# Patient Record
Sex: FEMALE | Race: WHITE | NOT HISPANIC OR LATINO | URBAN - METROPOLITAN AREA
[De-identification: names, ages, dates, MRNs, and addresses within clinical notes are randomized per-mention and may not be internally consistent; named-entity substitution may affect disease eponyms.]

---

## 2017-02-07 ENCOUNTER — HISTORICAL (OUTPATIENT)
Dept: ADMINISTRATIVE | Facility: HOSPITAL | Age: 52
End: 2017-02-07

## 2018-02-19 ENCOUNTER — HISTORICAL (OUTPATIENT)
Dept: ADMINISTRATIVE | Facility: HOSPITAL | Age: 53
End: 2018-02-19

## 2018-10-03 ENCOUNTER — HISTORICAL (OUTPATIENT)
Dept: ADMINISTRATIVE | Facility: HOSPITAL | Age: 53
End: 2018-10-03

## 2018-10-17 ENCOUNTER — HISTORICAL (OUTPATIENT)
Dept: CARDIOLOGY | Facility: HOSPITAL | Age: 53
End: 2018-10-17

## 2018-10-17 LAB
APTT PPP: 31.6 SECOND(S) (ref 24.8–36.9)
INR PPP: 0.93 (ref 0–1.27)
PLATELET # BLD AUTO: 303 X10(3)/MCL (ref 130–400)
PROTHROMBIN TIME: 12.7 SECOND(S) (ref 12.2–14.7)

## 2019-05-06 ENCOUNTER — HISTORICAL (OUTPATIENT)
Dept: ADMINISTRATIVE | Facility: HOSPITAL | Age: 54
End: 2019-05-06

## 2019-06-06 ENCOUNTER — HISTORICAL (OUTPATIENT)
Dept: ADMINISTRATIVE | Facility: HOSPITAL | Age: 54
End: 2019-06-06

## 2019-07-22 ENCOUNTER — HISTORICAL (OUTPATIENT)
Dept: ADMINISTRATIVE | Facility: HOSPITAL | Age: 54
End: 2019-07-22

## 2021-01-21 ENCOUNTER — HISTORICAL (OUTPATIENT)
Dept: ADMINISTRATIVE | Facility: HOSPITAL | Age: 56
End: 2021-01-21

## 2021-10-19 ENCOUNTER — HISTORICAL (OUTPATIENT)
Dept: ADMINISTRATIVE | Facility: HOSPITAL | Age: 56
End: 2021-10-19

## 2021-10-19 LAB
ABS NEUT (OLG): 7.94 X10(3)/MCL (ref 2.1–9.2)
ALBUMIN SERPL-MCNC: 3.7 GM/DL (ref 3.5–5)
ALBUMIN/GLOB SERPL: 0.8 RATIO (ref 1.1–2)
ALP SERPL-CCNC: 119 UNIT/L (ref 40–150)
ALT SERPL-CCNC: 14 UNIT/L (ref 0–55)
AST SERPL-CCNC: 17 UNIT/L (ref 5–34)
BASOPHILS # BLD AUTO: 0.07 X10(3)/MCL (ref 0–0.2)
BASOPHILS NFR BLD AUTO: 0.6 % (ref 0–1)
BILIRUB SERPL-MCNC: 0.3 MG/DL (ref 0.2–1.2)
BILIRUBIN DIRECT+TOT PNL SERPL-MCNC: 0.1 MG/DL (ref 0–0.5)
BILIRUBIN DIRECT+TOT PNL SERPL-MCNC: 0.2 MG/DL (ref 0–0.8)
BUN SERPL-MCNC: 15.7 MG/DL (ref 9.8–20.1)
CALCIUM SERPL-MCNC: 9.9 MG/DL (ref 8.4–10.2)
CHLORIDE SERPL-SCNC: 106 MMOL/L (ref 98–107)
CO2 SERPL-SCNC: 25 MMOL/L (ref 22–29)
CREAT SERPL-MCNC: 1.09 MG/DL (ref 0.57–1.11)
EOSINOPHIL # BLD AUTO: 0.23 X10(3)/MCL (ref 0–0.9)
EOSINOPHIL NFR BLD AUTO: 1.9 % (ref 0–6.4)
ERYTHROCYTE [DISTWIDTH] IN BLOOD BY AUTOMATED COUNT: 14.8 % (ref 11.5–17)
EST. AVERAGE GLUCOSE BLD GHB EST-MCNC: 125.5 MG/DL
GLOBULIN SER-MCNC: 4.4 GM/DL (ref 2.4–3.5)
GLUCOSE SERPL-MCNC: 115 MG/DL (ref 74–100)
HBA1C MFR BLD: 6 %
HCT VFR BLD AUTO: 43.6 % (ref 37–47)
HGB BLD-MCNC: 13.6 GM/DL (ref 12–16)
IMM GRANULOCYTES # BLD AUTO: 0.04 10*3/UL (ref 0–0.02)
IMM GRANULOCYTES NFR BLD AUTO: 0.3 % (ref 0–0.43)
LYMPHOCYTES # BLD AUTO: 2.82 X10(3)/MCL (ref 0.6–4.6)
LYMPHOCYTES NFR BLD AUTO: 23.5 % (ref 16–44)
MCH RBC QN AUTO: 27.3 PG (ref 27–31)
MCHC RBC AUTO-ENTMCNC: 31.2 GM/DL (ref 33–36)
MCV RBC AUTO: 87.4 FL (ref 80–94)
MONOCYTES # BLD AUTO: 0.89 X10(3)/MCL (ref 0.1–1.3)
MONOCYTES NFR BLD AUTO: 7.4 % (ref 4–12.1)
MRSA SCREEN BY PCR: NEGATIVE
NEUTROPHILS # BLD AUTO: 7.94 X10(3)/MCL (ref 2.1–9.2)
NEUTROPHILS NFR BLD AUTO: 66.3 % (ref 43–73)
NRBC BLD AUTO-RTO: 0 % (ref 0–0.2)
PLATELET # BLD AUTO: 367 X10(3)/MCL (ref 130–400)
PMV BLD AUTO: 9.5 FL (ref 7.4–10.4)
POTASSIUM SERPL-SCNC: 3.9 MMOL/L (ref 3.5–5.1)
PROT SERPL-MCNC: 8.1 GM/DL (ref 6.4–8.3)
RBC # BLD AUTO: 4.99 X10(6)/MCL (ref 4.2–5.4)
SODIUM SERPL-SCNC: 141 MMOL/L (ref 136–145)
WBC # SPEC AUTO: 12 X10(3)/MCL (ref 4.5–11.5)

## 2021-11-05 ENCOUNTER — HISTORICAL (OUTPATIENT)
Dept: LAB | Facility: HOSPITAL | Age: 56
End: 2021-11-05

## 2021-11-05 LAB — SARS-COV-2 AG RESP QL IA.RAPID: NEGATIVE

## 2021-11-23 ENCOUNTER — HISTORICAL (OUTPATIENT)
Dept: ADMINISTRATIVE | Facility: HOSPITAL | Age: 56
End: 2021-11-23

## 2021-12-21 ENCOUNTER — HISTORICAL (OUTPATIENT)
Dept: ADMINISTRATIVE | Facility: HOSPITAL | Age: 56
End: 2021-12-21

## 2022-04-10 ENCOUNTER — HISTORICAL (OUTPATIENT)
Dept: ADMINISTRATIVE | Facility: HOSPITAL | Age: 57
End: 2022-04-10
Payer: MEDICARE

## 2022-04-27 VITALS
SYSTOLIC BLOOD PRESSURE: 117 MMHG | HEIGHT: 64 IN | WEIGHT: 235.88 LBS | DIASTOLIC BLOOD PRESSURE: 68 MMHG | BODY MASS INDEX: 40.27 KG/M2

## 2022-05-03 NOTE — HISTORICAL [facility] CERNER
This is a historical note converted from Zhou. Formatting and pictures may have been removed.  Please reference Zhou for original formatting and attached multimedia. Chief Complaint  BILATERAL KNEE PAIN DOI 2 MONTHS NO INJURY.  History of Present Illness  She comes in complaining of bilateral knee pain. I had last seen her for her back and recommended lumbar epidural steroid injections. She was not called from the doctor and Gaby to have these performed.?Her back hurts?going down both legs. Both knees hurt over the inside portion of the knees. Of the right knee and goes all the way down the leg to the foot this is more consistent with her back. In the left knee it hurts all the way around to the back part of it. Walking makes it hurt and sometimes even?bending them her sleeping causes him to hurt.  ?  Her back continues to hurt all the time.  Review of Systems  Constitutional:?no fever, fatigue, weakness  Eye:?no vision loss, eye redness, drainage, or pain  ENMT:?no sore throat, ear pain, sinus pain/congestion, nasal congestion/drainage  Respiratory:?no cough, no wheezing, no shortness of breath  Cardiovascular:?no chest pain, no palpitations, no edema  Gastrointestinal:?no nausea, vomiting, or diarrhea. No abdominal pain  Genitourinary:?no dysuria, no urinary frequency or urgency, no hematuria  Hema/Lymph:?no abnormal bruising or bleeding  Endocrine:?no heat or cold intolerance, no excessive thirst or excessive urination  Musculoskeletal:?no muscle or joint pain, no joint swelling  Integumentary:?no skin rash or abnormal lesion  Neurologic: no headache, no dizziness, no weakness or numbness  ?  Physical Exam  Vitals & Measurements  BP:?128/82?  HT:?156?cm? WT:?114.75?kg? BMI:?47.15?  Examination of the?right knee reveals tenderness?around the medial aspect. She has crepitus throughout a range of motion. She flexes from 0-505?. She is stable. Neurovascularly she is intact.  ?  Examination of the left knee  reveals tenderness over the medial joint line and posteriorly.?Again she has crepitance primarily medially and under the patella joint. She is stable to varus and valgus.  ?  Examination of her lumbar spine is unchanged.  Assessment/Plan  1.?Lumbar spinal stenosis?M48.061  ? I explained to her that she has to lose weight her BMI is well over 45. She is diabetic and we discussed injecting both knees but she would rather not.?We will try her on Mobic 7.5.?I did caution her about being on her blood thinners.?The injection she would rather not have secondary to her diabetes. She did request for us to try to?set up her LESIs here in [place] and we will do that.?I will see her back on an as-needed basis.  Ordered:  meloxicam, 7.5 mg = 1 tab(s), Oral, BID, # 60 tab(s), 3 Refill(s), Pharmacy: [facility]  Office/Outpatient Visit Level 3 Established [id number] PC, Lumbar spinal stenosis  Lumbar radiculopathy  Lumbar degenerative disc disease  Bilateral primary osteoarthritis of knee  Knee pain, [facility], 05/06/19 10:28:00 CDT  ?  2.?Lumbar radiculopathy?M54.16  Ordered:  meloxicam, 7.5 mg = 1 tab(s), Oral, BID, # 60 tab(s), 3 Refill(s), Pharmacy: [facility]  Office/Outpatient Visit Level 3 Established [id number] PC, Lumbar spinal stenosis  Lumbar radiculopathy  Lumbar degenerative disc disease  Bilateral primary osteoarthritis of knee  Knee pain, [facility], 05/06/19 10:28:00 CDT  ?  3.?Lumbar degenerative disc disease?M51.36  Ordered:  meloxicam, 7.5 mg = 1 tab(s), Oral, BID, # 60 tab(s), 3 Refill(s), Pharmacy: [facility]  Office/Outpatient Visit Level 3 Established [id number] PC, Lumbar spinal stenosis  Lumbar radiculopathy  Lumbar degenerative disc disease  Bilateral primary osteoarthritis of knee  Knee pain, [facility], 05/06/19 10:28:00 CDT  ?  4.?Bilateral primary osteoarthritis of knee?M17.0  Ordered:  meloxicam, 7.5 mg = 1 tab(s), Oral, BID, # 60 tab(s), 3  Refill(s), Pharmacy: [id number]  Office/Outpatient Visit Level 3 Established [id number] PC, Lumbar spinal stenosis  Lumbar radiculopathy  Lumbar degenerative disc disease  Bilateral primary osteoarthritis of knee  Knee pain, [facility], 19 10:28:00 CDT  ?  Orders:  Clinic Follow-up PRN, 19 10:28:00 CDT, Future Order, [facility]  XR Knee Left 4 or More Views, Routine, 19 10:03:00 CDT, Pain, None, Patient Bed, Rad Type, Knee pain, Not Scheduled, 19 10:03:00 CDT  XR Knee Right 4 or More Views, Routine, 19 10:03:00 CDT, Pain, None, Patient Bed, Rad Type, Knee pain, Not Scheduled, 19 10:03:00 CDT  Referrals  Ambulatory Referral, Specialty: Physical Medicine & Rehab, Reason: LESI, Start: 19 10:28:00 CDT  Clinic Follow-up PRN, 19 10:28:00 CDT, Future Order, [facility]   Problem List/Past Medical History  Ongoing  Bladder  DVT (deep venous thrombosis)  Lumbar degenerative disc disease  Lumbar facet arthropathy  Lumbar radiculopathy  Lumbar spinal stenosis  Morbid obesity  O/E wound healing delayed  PE (pulmonary embolism)  Historical  Acute gingivitis  Anticoagulants causing adverse effect in therapeutic use  Arthropathy  Benign essential hypertension  Dental caries  Dry skin  Endometrial intraepithelial neoplasia (EIN)  Fatigue  GERD - Gastro-esophageal reflux disease  Hand joint pain  Hypertension  Menopausal flushing  Menopausal state  Mixed hyperlipidemia  Obesity  Osteoarthrosis  Pain in limb  Postmenopausal bleeding  Sleep disturbance  Suprapubic pain  Urinary tract infection  Visual impairment  Procedure/Surgical History  Fluoroscopy of Spinal Cord using Low Osmolar Contrast (10/17/2018)  Myelography via lumbar injection, including radiological supervision and interpretation; lumbosacral (10/17/2018)  removal of gallbladder (2000)  appendectomy ()  Abdominal hysterectomy   Delivery  dilation and curettage  Hernia  Revise  ovarian tube   Medications  atenolol 50 mg oral tablet, 50 mg= 1 tab(s), Oral, Daily  azithromycin 250 mg oral tablet, Oral, As Directed  cefuroxime 250 mg oral tablet, 250 mg= 1 tab(s), Oral, BID  cetirizine 10 mg oral tablet, 10 mg= 1 tab(s), Oral, Daily  citalopram 20 mg oral tablet, 20 mg= 1 tab(s), Oral, Daily  famotidine 20 mg oral tablet, 20 mg= 1 tab(s), Oral, BID  lisinopril 5 mg oral tablet, 5 mg= 1 tab(s), Oral, Daily  Mobic 7.5 mg oral tablet, 7.5 mg= 1 tab(s), Oral, BID, 3 refills  nitrofurantoin macrocrystals 100 mg oral capsule, 100 mg= 1 cap(s), Oral, BID  nitrofurantoin macrocrystals 50 mg oral capsule, 50 mg= 1 cap(s), Oral, qPM  nitrofurantoin macrocrystals-monohydrate 100 mg oral capsule, 100 mg= 1 cap(s), Oral, BID  Nystop 100,000 units/g topical powder, 1 bhavesh, TOP, TID  PRAVASTATIN SODIUM 20 MG TAB  sulfamethoxazole-trimethoprim 800 mg-160 mg oral tablet, 1 tab(s), Oral, BID  Toviaz 4 mg oral tablet, extended release, 4 mg= 1 tab(s), Oral, Daily  Xarelto 20mg Tablet, 20 mg, Oral, With Supper  Allergies  No Known Allergies  Social History  Alcohol - Denies Alcohol Use, 09/18/2015  Never, 09/23/2015  Employment/School  Unemployed, 09/18/2015  Home/Environment  Lives with Children., 09/23/2015  Substance Abuse  Never, 09/23/2015  Tobacco - Denies Tobacco Use, 09/18/2015  Never smoker, 09/23/2015  Family History  Chronic obstructive lung disease: Mother.  Heart disease: Father.  Pneumonia.: Father.  Health Maintenance  Health Maintenance  ???Pending?(in the next year)  ??? ??OverDue  ??? ? ? ?Alcohol Misuse Screening due??01/01/19??and every 1??year(s)  ??? ??Due?  ??? ? ? ?ADL Screening due??05/06/19??and every 1??year(s)  ??? ? ? ?Colorectal Screening due??05/06/19??and every?  ??? ? ? ?Diabetes Maintenance-Foot Exam due??05/06/19??and every?  ??? ? ? ?Tetanus Vaccine due??05/06/19??and every 10??year(s)  ??? ??Due In Future?  ??? ? ? ?Obesity Screening not due until??01/01/20??and every  1??year(s)  ??? ? ? ?Diabetes Maintenance-HgbA1c not due until??01/09/20??and every 1??year(s)  ??? ? ? ?Diabetes Maintenance-Fasting Lipid Profile not due until??01/09/20??and every 1??year(s)  ??? ? ? ?Hypertension Management-BMP not due until??01/09/20??and every 1??year(s)  ??? ? ? ?Diabetes Maintenance-Eye Exam not due until??03/13/20??and every 2??year(s)  ??? ? ? ?Breast Cancer Screening not due until??03/18/20??and every 2??year(s)  ??? ? ? ?Blood Pressure Screening not due until??05/05/20??and every 1??year(s)  ??? ? ? ?Body Mass Index Check not due until??05/05/20??and every 1??year(s)  ??? ? ? ?Hypertension Management-Blood Pressure not due until??05/05/20??and every 1??year(s)  ???Satisfied?(in the past 1 year)  ??? ??Satisfied?  ??? ? ? ?Blood Pressure Screening on??05/06/19.??Satisfied by Jonah Kern  ??? ? ? ?Body Mass Index Check on??05/06/19.??Satisfied by Jonah Kern  ??? ? ? ?Depression Screening on??10/22/18.??Satisfied by Jonah Kern  ??? ? ? ?Hypertension Management-Blood Pressure on??05/06/19.??Satisfied by Jonah Kern  ??? ? ? ?Influenza Vaccine on??10/22/18.??Satisfied by Jonah Kern  ??? ? ? ?Obesity Screening on??05/06/19.??Satisfied by Jonah Kern  ?  ?  Diagnostic Results  4 views of both knees reveals severe?bone-on-bone?medial joint osteoarthritis along the patellofemoral joint osteoarthritis.

## 2022-05-03 NOTE — HISTORICAL [facility] CERNER
This is a historical note converted from Zhou. Formatting and pictures may have been removed.  Please reference Zhou for original formatting and attached multimedia. Chief Complaint  F/U R total knee, GL 11/8/21-2/6/22  History of Present Illness  56-year-old female presents here for follow-up of?right knee arthroplasty. Overall patient doing very well. She is very satisfied with her recovery. She feels good relief of her symptoms and is happy with her recovery thus far.  Review of Systems  Denies fevers, chills, chest pain, shortness of breath. Comprehensive review of systems performed and otherwise negative except as noted in HPI.  Physical Exam  Vitals & Measurements  T:?98.2? ?F (Oral)? HR:?65(Peripheral)? BP:?117/68?  HT:?162.00?cm? WT:?107.000?kg? BMI:?40.77?  General: No acute distress, alert and oriented, healthy appearing?  HEENT: Head is atraumatic, mucous membranes are moist?  Cardiovascular: Brisk capillary refill  Lungs: Breathing non-labored?  Skin: no rashes appreciated?  Neurologic: Sensation is grossly intact distally  ?  Examination knee:  Incision clean dry and intact. No erythema or drainage or signs of infection.  Sensation intact distally to foot  Positive FHL/EHL/gastrocsoleus/tib ant  Brisk capillary refill to foot  No swelling or signs of DVT.  Range of motion: 0 to 110  Stable to varus valgus  Stable to anterior and posterior drawer  Assessment/Plan  1.?Aftercare following joint replacement?Z47.1  ?Patient overall doing fairly well following the arthroplasty. She is very satisfied with her recovery.?Plan to see her back?in?2 months. Recommend ongoing physical therapy?as well as home exercise program.  Ordered:  Clinic Follow up, *Est. 02/22/22 10:15:00 CST, Order for future visit, Aftercare following joint replacement, Orthopaedics  Post-Op follow-up visit [id number] PC, Aftercare following joint replacement, Orthopaedics, 12/21/21 10:39:00 CST  XR Knee Left 4 or More Views, Routine,  *Est. 02/21/22 3:00:00 CST, Arthritis, None, Stretcher, Patient Has IV?, Rad Type, Order for future visit, Aftercare following joint replacement, Not Scheduled, *Est. 02/21/22 3:00:00 CST  ?  Referrals  Clinic Follow up, *Est. 02/22/22 10:15:00 CST, Order for future visit, Aftercare following joint replacement, [facility]   Problem List/Past Medical History  Ongoing  Bladder  Diabetes mellitus  DVT (deep venous thrombosis)  Hysterectomy  Lumbar degenerative disc disease  Lumbar facet arthropathy  Lumbar radiculopathy  Lumbar spinal stenosis  Morbid obesity  O/E wound healing delayed  PE (pulmonary embolism)  Historical  Acute gingivitis  Anticoagulants causing adverse effect in therapeutic use  Arthropathy  Benign essential hypertension  Dental caries  Dry skin  Endometrial intraepithelial neoplasia (EIN)  Fatigue  GERD - Gastro-esophageal reflux disease  Hand joint pain  Hypertension  Menopausal flushing  Menopausal state  Mixed hyperlipidemia  Obesity  Osteoarthrosis  Pain in limb  Postmenopausal bleeding  Sleep disturbance  Suprapubic pain  Urinary tract infection  Visual impairment  Procedure/Surgical History  [facility] Total Knee Arthroplasty (Right) (11/08/2021)  Replacement of Right Knee Joint with Synthetic Substitute, Open Approach (11/08/2021)  Robotic Assisted Procedure of Lower Extremity, Open Approach (11/08/2021)  85643 - INJ FORAMEN EPIDURAL LUM / SAC 1 LEVEL (Right) (07/22/2019)  80193 - INJ FORAMEN EPIDURAL LUM / SAC EA ADDL LEVEL (Right) (07/22/2019)  Injection(s), anesthetic agent and/or steroid, transforaminal epidural, with imaging guidance (fluoroscopy or CT); lumbar or sacral, each additional level (List separately in addition to code for primary procedure) (07/22/2019)  Injection(s), anesthetic agent and/or steroid, transforaminal epidural, with imaging guidance (fluoroscopy or CT); lumbar or sacral, single level (07/22/2019)  Introduction of Anesthetic Agent into Spinal Canal,  Percutaneous Approach (2019)  Introduction of Anti-inflammatory into Spinal Canal, Percutaneous Approach (2019)  19105 - INJ FORAMEN EPIDURAL LUM / SAC 1 LEVEL (Right) (2019)  10811 - INJ FORAMEN EPIDURAL LUM / SAC EA ADDL LEVEL (Right) (2019)  Injection(s), anesthetic agent and/or steroid, transforaminal epidural, with imaging guidance (fluoroscopy or CT); lumbar or sacral, each additional level (List separately in addition to code for primary procedure) (2019)  Injection(s), anesthetic agent and/or steroid, transforaminal epidural, with imaging guidance (fluoroscopy or CT); lumbar or sacral, single level (2019)  Introduction of Anesthetic Agent into Spinal Canal, Percutaneous Approach (2019)  Introduction of Anti-inflammatory into Spinal Canal, Percutaneous Approach (2019)  Fluoroscopy of Spinal Cord using Low Osmolar Contrast (10/17/2018)  Myelography via lumbar injection, including radiological supervision and interpretation; lumbosacral (10/17/2018)  removal of gallbladder (2000)  appendectomy ()  Abdominal hysterectomy   Delivery  Colonoscopy  dilation and curettage  Hernia  Revise ovarian tube   Medications  acetaminophen-hydrocodone 325 mg-5 mg oral tablet, 1 tab(s), Oral, q6hr  atenolol 50 mg oral tablet, 50 mg= 1 tab(s), Oral, Daily  citalopram 20 mg oral tablet, 20 mg= 1 tab(s), Oral, Daily  cloniDINE 0.1 mg oral tablet, 0.1 mg= 1 tab(s), Oral, BID, PRN,? ?Still taking, not as prescribed: prn  famotidine 20 mg oral tablet, 20 mg= 1 tab(s), Oral, Daily,? ?Not taking  Flomax 0.4 mg oral capsule, 0.4 mg= 1 cap(s), Oral, qPM, 1 refills,? ?Not taking  lisinopril 10 mg oral tablet, 5 mg= 0.5 tab(s), Oral, Daily  methocarbamol 750 mg oral tablet, 1500 mg= 2 tab(s), Oral, TID  Neurontin 300 mg oral capsule, 300 mg= 1 cap(s), Oral, At Bedtime  Norco 7.5 mg-325 mg oral tablet, 1 tab(s), Oral, q6hr, PRN  PRAVASTATIN SODIUM 20 MG TAB, 1, Daily  Xarelto  20mg Tablet, 20 mg, Oral, With Supper  Allergies  No Known Allergies  Social History  Abuse/Neglect  No, No, Yes, 12/21/2021  Alcohol - Denies Alcohol Use, 09/18/2015  Never, 11/23/2021  Employment/School  Disabled, 05/16/2019  Exercise  Home/Environment  Lives with Children., 09/23/2015  Nutrition/Health  Regular, 05/16/2019  Substance Use  Never, 09/23/2015  Tobacco - Denies Tobacco Use, 09/18/2015  Never (less than 100 in lifetime), N/A, 12/21/2021  Family History  Chronic obstructive lung disease: Mother.  Heart disease: Father.  Pneumonia.: Father.  Health Maintenance  Health Maintenance  ???Pending?(in the next year)  ??? ??OverDue  ??? ? ? ?Diabetes Maintenance-Eye Exam due??03/13/20??and every 2??year(s)  ??? ??Due?  ??? ? ? ?ADL Screening due??12/21/21??and every 1??year(s)  ??? ? ? ?Aspirin Therapy for CVD Prevention due??12/21/21??and every 1??year(s)  ??? ? ? ?Colorectal Screening due??12/21/21??and every 10??year(s)  ??? ? ? ?Diabetes Maintenance-Fasting Lipid Profile due??12/21/21??Variable frequency  ??? ? ? ?Diabetes Maintenance-Foot Exam due??12/21/21??Unknown Frequency  ??? ? ? ?Medicare Annual Wellness Exam due??12/21/21??and every 1??year(s)  ??? ? ? ?Tetanus Vaccine due??12/21/21??and every 10??year(s)  ??? ? ? ?Zoster Vaccine due??12/21/21??Unknown Frequency  ??? ??Due In Future?  ??? ? ? ?Obesity Screening not due until??01/01/22??and every 1??year(s)  ??? ? ? ?Alcohol Misuse Screening not due until??01/02/22??and every 1??year(s)  ??? ? ? ?Breast Cancer Screening not due until??07/13/22??and every 2??year(s)  ??? ? ? ?Diabetes Maintenance-HgbA1c not due until??10/19/22??and every 1??year(s)  ??? ? ? ?Diabetes Maintenance-Medication Prescribed not due until??11/09/22??and every 1??year(s)  ??? ? ? ?Hypertension Management-BMP not due until??11/10/22??and every 1??year(s)  ??? ? ? ?Diabetes Maintenance-Serum Creatinine not due until??11/10/22??and every 1??year(s)  ??? ? ? ?Blood Pressure  Screening not due until??11/23/22??and every 1??year(s)  ??? ? ? ?Body Mass Index Check not due until??11/23/22??and every 1??year(s)  ??? ? ? ?Depression Screening not due until??11/23/22??and every 1??year(s)  ??? ? ? ?Hypertension Management-Blood Pressure not due until??11/23/22??and every 1??year(s)  ???Satisfied?(in the past 1 year)  ??? ??Satisfied?  ??? ? ? ?Alcohol Misuse Screening on??04/13/21.??Satisfied by Gypsy Kebede LPN  ??? ? ? ?Blood Pressure Screening on??12/21/21.??Satisfied by Angi Waldron  ??? ? ? ?Body Mass Index Check on??12/21/21.??Satisfied by Angi Waldron  ??? ? ? ?Depression Screening on??11/23/21.??Satisfied by Princess Chester  ??? ? ? ?Diabetes Maintenance-Serum Creatinine on??11/10/21.??Satisfied by Isabel Rivera  ??? ? ? ?Diabetes Screening on??11/10/21.??Satisfied by Isabel Rivera  ??? ? ? ?Hypertension Management-Blood Pressure on??12/21/21.??Satisfied by Angi Waldron  ??? ? ? ?Influenza Vaccine on??11/23/21.??Satisfied by Princess Chester  ??? ? ? ?Obesity Screening on??12/21/21.??Satisfied by Angi Waldron  ?  Diagnostic Results  AP lateral right knee reviewed. Patients implants well fixed with no signs of loosening or subsidence noted.

## 2022-05-03 NOTE — HISTORICAL [facility] CERNER
This is a historical note converted from [employer]. Formatting and pictures may have been removed.  Please reference [employer] for original formatting and attached multimedia. Chief Complaint  Pt is here for right knee pain. Pt stated the pain has been going on for a little over a year.  History of Present Illness  55-year-old female resents here follow-up of right knee pain. ?Patient got an injection of cortisone about 3?months ago with only a few days relief of her pain.? She is having ongoing planes of pain to the right as well as the left knee. ?She has injured other options.? Patient has?been unsuccessful thus far with weight loss.  Review of Systems  Denies fevers, chills, chest pain, shortness of breath. Comprehensive review of systems performed and otherwise negative except as noted in HPI.  Physical Exam  Vitals & Measurements  T:?36.5? ?C (Oral)? HR:?80(Peripheral)? BP:?137/84?  HT:?156.00?cm? WT:?114.750?kg? BMI:?47.15?  General: No acute distress, alert and oriented, healthy appearing?  HEENT: Head is atraumatic, mucous membranes are moist?  Cardiovascular: Brisk capillary refill  Lungs: Breathing non-labored?  Skin: no rashes appreciated?  Neurologic: Sensation is grossly intact distally  ?  Right knee:  Patient overall varus alignment is not correctable. ?Testing of crepitus patellofemoral joint and medial joint line. ?Full extension. ?Flexion to 95 degrees.  Assessment/Plan  1.?Primary osteoarthritis of right knee?M17.11  ?Patient end-stage arthritis the right knee peer discussed all treatment options. ?She is not a candidate for?arthroplasty at the current time given her elevated BMI. ?She need to lose between 50 and 70 pounds to get down to a BMI at or near 40. ?She has failed cortisone thus far. ?We will get her approved for viscosupplementation?and see her back for this.   Problem List/Past Medical History  Ongoing  Bladder  Diabetes mellitus  DVT (deep venous thrombosis)  Hysterectomy  Lumbar  degenerative disc disease  Lumbar facet arthropathy  Lumbar radiculopathy  Lumbar spinal stenosis  Morbid obesity  O/E wound healing delayed  PE (pulmonary embolism)  Historical  Acute gingivitis  Anticoagulants causing adverse effect in therapeutic use  Arthropathy  Benign essential hypertension  Dental caries  Dry skin  Endometrial intraepithelial neoplasia (EIN)  Fatigue  GERD - Gastro-esophageal reflux disease  Hand joint pain  Hypertension  Menopausal flushing  Menopausal state  Mixed hyperlipidemia  Obesity  Osteoarthrosis  Pain in limb  Postmenopausal bleeding  Sleep disturbance  Suprapubic pain  Urinary tract infection  Visual impairment  Procedure/Surgical History  53822 - INJ FORAMEN EPIDURAL LUM / SAC 1 LEVEL (Right) (07/22/2019)  31938 - INJ FORAMEN EPIDURAL LUM / SAC EA ADDL LEVEL (Right) (07/22/2019)  Injection(s), anesthetic agent and/or steroid, transforaminal epidural, with imaging guidance (fluoroscopy or CT); lumbar or sacral, each additional level (List separately in addition to code for primary procedure) (07/22/2019)  Injection(s), anesthetic agent and/or steroid, transforaminal epidural, with imaging guidance (fluoroscopy or CT); lumbar or sacral, single level (07/22/2019)  Introduction of Anesthetic Agent into Spinal Canal, Percutaneous Approach (07/22/2019)  Introduction of Anti-inflammatory into Spinal Canal, Percutaneous Approach (07/22/2019)  36894 - INJ FORAMEN EPIDURAL LUM / SAC 1 LEVEL (Right) (06/06/2019)  66956 - INJ FORAMEN EPIDURAL LUM / SAC EA ADDL LEVEL (Right) (06/06/2019)  Injection(s), anesthetic agent and/or steroid, transforaminal epidural, with imaging guidance (fluoroscopy or CT); lumbar or sacral, each additional level (List separately in addition to code for primary procedure) (06/06/2019)  Injection(s), anesthetic agent and/or steroid, transforaminal epidural, with imaging guidance (fluoroscopy or CT); lumbar or sacral, single level (06/06/2019)  Introduction of  Anesthetic Agent into Spinal Canal, Percutaneous Approach (2019)  Introduction of Anti-inflammatory into Spinal Canal, Percutaneous Approach (2019)  Fluoroscopy of Spinal Cord using Low Osmolar Contrast (10/17/2018)  Myelography via lumbar injection, including radiological supervision and interpretation; lumbosacral (10/17/2018)  removal of gallbladder (2000)  appendectomy ()  Abdominal hysterectomy   Delivery  Colonoscopy  dilation and curettage  Hernia  Revise ovarian tube   Medications  acetaminophen-hydrocodone 325 mg-5 mg oral tablet, 1 tab(s), Oral, BID  atenolol 50 mg oral tablet, 50 mg= 1 tab(s), Oral, Daily  cetirizine 10 mg oral tablet, 10 mg= 1 tab(s), Oral, Daily,? ?Not taking  citalopram 20 mg oral tablet, 20 mg= 1 tab(s), Oral, Daily  famotidine 20 mg oral tablet, 20 mg= 1 tab(s), Oral, BID,? ?Not taking  gabapentin 300 mg oral capsule, 300 mg= 1 cap(s), Oral, TID  lisinopril 10 mg oral tablet, 5 mg= 0.5 tab(s), Oral, Daily  lisinopril 5 mg oral tablet, 5 mg= 1 tab(s), Oral, qPM,? ?Not taking  metformin 500 mg oral tablet, 500 mg= 1 tab(s), Oral, BID,? ?Not taking  Mobic 7.5 mg oral tablet, 7.5 mg= 1 tab(s), Oral, BID, 3 refills,? ?Not taking  nitrofurantoin macrocrystals 50 mg oral capsule, 50 mg= 1 cap(s), Oral, QID  Nystop 100,000 units/g topical powder, 1 bhavesh, TOP, TID,? ?Not taking  PRAVASTATIN SODIUM 20 MG TAB  tiZANidine 4 mg oral tablet, 8 mg= 2 tab(s), Oral, q8hr, PRN,? ?Not taking  tiZANidine 4 mg oral tablet, 4 mg= 1 tab(s), Oral, q8hr,? ?Not taking: Last Dose Date/Time Unknown  tiZANidine 4 mg oral tablet, 8 mg= 2 tab(s), Oral, q8hr, PRN,? ?Not taking  Toviaz 4 mg oral tablet, extended release, 4 mg= 1 tab(s), Oral, Daily,? ?Not taking  Xarelto 20mg Tablet, 20 mg, Oral, With Supper,? ?Not taking  Allergies  No Known Allergies  Social History  Abuse/Neglect  No, 2021  Alcohol - Denies Alcohol Use, 2015  Never, 2015  Employment/School  Disabled,  05/16/2019  Exercise  Home/Environment  Lives with Children., 09/23/2015  Nutrition/Health  Regular, 05/16/2019  Substance Use  Never, 09/23/2015  Tobacco - Denies Tobacco Use, 09/18/2015  Never (less than 100 in lifetime), N/A, 01/21/2021  Family History  Chronic obstructive lung disease: Mother.  Heart disease: Father.  Pneumonia.: Father.  Health Maintenance  Health Maintenance  ???Pending?(in the next year)  ??? ??OverDue  ??? ? ? ?Diabetes Maintenance-Serum Creatinine due??11/13/16??and every 1??year(s)  ??? ? ? ?Diabetes Maintenance-Eye Exam due??03/14/19??and every 1??year(s)  ??? ? ? ?Influenza Vaccine due??10/01/20??and every 1??day(s)  ??? ??Due?  ??? ? ? ?Alcohol Misuse Screening due??01/02/21??and every 1??year(s)  ??? ? ? ?ADL Screening due??01/21/21??and every 1??year(s)  ??? ? ? ?Aspirin Therapy for CVD Prevention due??01/21/21??and every 1??year(s)  ??? ? ? ?Colorectal Screening due??01/21/21??and every 10??year(s)  ??? ? ? ?Diabetes Maintenance-Fasting Lipid Profile due??01/21/21??Variable frequency  ??? ? ? ?Diabetes Maintenance-Foot Exam due??01/21/21??Unknown Frequency  ??? ? ? ?Medicare Annual Wellness Exam due??01/21/21??and every 1??year(s)  ??? ? ? ?Tetanus Vaccine due??01/21/21??and every 10??year(s)  ??? ? ? ?Zoster Vaccine due??01/21/21??Unknown Frequency  ??? ??Due In Future?  ??? ? ? ?Blood Pressure Screening not due until??01/27/21??and every 1??year(s)  ??? ? ? ?Body Mass Index Check not due until??01/27/21??and every 1??year(s)  ??? ? ? ?Hypertension Management-Blood Pressure not due until??01/27/21??and every 1??year(s)  ??? ? ? ?Depression Screening not due until??09/08/21??and every 1??year(s)  ??? ? ? ?Obesity Screening not due until??01/01/22??and every 1??year(s)  ??? ? ? ?Diabetes Maintenance-HgbA1c not due until??01/13/22??and every 1??year(s)  ??? ? ? ?Hypertension Management-BMP not due until??01/13/22??and every 1??year(s)  ???Satisfied?(in the past 1 year)  ???  ??Satisfied?  ??? ? ? ?Blood Pressure Screening on??01/21/21.??Satisfied by Gypsy Saleh LPN  ??? ? ? ?Body Mass Index Check on??01/21/21.??Satisfied by Gypsy Saleh LPN  ??? ? ? ?Depression Screening on??01/21/21.??Satisfied by Gypsy Saleh LPN  ??? ? ? ?Hypertension Management-Blood Pressure on??01/21/21.??Satisfied by Gypsy Saleh LPN  ??? ? ? ?Influenza Vaccine on??01/21/21.??Satisfied by Gypsy Saleh LPN  ??? ? ? ?Obesity Screening on??01/21/21.??Satisfied by Gypsy Saleh LPN  ?  Diagnostic Results  AP lateral knee reviewed. ?Patient with end-stage arthritis with loss of joint space the medial joint line. 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

## 2022-05-03 NOTE — HISTORICAL [facility] CERNER
This is a historical note converted from [employer]. Formatting and pictures may have been removed.  Please reference [employer] for original formatting and attached multimedia. Procedure Name  ?  Right L4 and Right L5?Transforaminal Epidural Steroid Injections  ?   Pre-Procedure Diagnoses:  1. Chronic pain syndrome  2. Low back pain  3. Lumbar radiculopathy  4. Lumbar disc displacement  5. Lumbar degenerative disc disease  ?   Post-Procedure Diagnoses:  1. Chronic pain syndrome  2. Low back pain  3. Lumbar radiculopathy  4. Lumbar disc displacement  5. Lumbar degenerative disc disease  ?   Anesthesia:  Local and MAC  ?  Estimated Blood Loss:  None  ?  Complications:  None  ?  Informed Consent:  The procedure, risks, benefits, and alternatives were discussed with the patient.? There were no contraindications to the procedure.? The patient expressed understanding and agreed to proceed.? Fully informed written consent was obtained.?  ?  Description of the Procedure:  The patient was taken to the operating room.? IV access was obtained prior to the start of the procedure.? The patient was positioned prone on the fluoroscopy table.? Continuous hemodynamic monitoring was initiated and continued throughout the duration of the procedure.? The skin overlying the lumbosacral spine was prepped with Betadine and draped into a sterile field.? An oblique fluoroscopic view was obtained on the right side at L4 with the superior articular process of the inferior vertebral body aligned with the pedicle.? Skin anesthesia was achieved using 1 mL of 1% lidocaine.? A 22-gauge 5 inch Quinke spinal needle was slowly inserted and advanced towards the 6 oclock position of the pedicle and into the epidural space.? Proper needle position was confirmed under AP, oblique, and lateral fluoroscopic views.? Negative aspiration for blood or CSF was confirmed.? 0.5 mL of Isovue contrast was injected.? Fluoroscopic imaging revealed a clear outline of the  spinal nerve with proximal spread of agent through the neural foramen and into the epidural space.? Then a combination of 5 mg of dexamethasone and 1 mL of 0.5% bupivacaine was easily injected.? There was no pain on injection.? The needle was removed intact and bleeding was nil.? The same procedure was repeated in identical fashion on the right side at L5. Sterile bandages were applied.? The patient was taken to the recovery room for further observation in stable condition.? The patient was then discharged home without any complications.

## 2022-05-03 NOTE — HISTORICAL [facility]
This is a historical note converted from [employer]. Formatting and pictures may have been removed.  Please reference [employer] for original formatting and attached multimedia. Chief Complaint  F/U visit: R knee OA  History of Present Illness  53 yo?female?non-smoker?presents to ortho clinic for?routine follow up?for?right?knee pain.? Patient points to ?medial knee.  Onset: ?Insidious over years  Current pain level: 8/10 (rated as?moderate) ?without pain medication. Quality described as?sharp?Patient reports using?OTC?medications PRN pain with?moderate?relief.? Patient?acknowledges?use of pain medication today.?  Modifying Factors: ?Worse with/after activity;Improved with rest;??Stiffness after immobilization??Stiffness improved with less than 30 minutes of activity  Previous treatment:Conservative treatment for at least 3 months with HEP/ medications.?CSI x 1 in ortho clinic with good result, requesting another injection today.  Previous injuries:?Denies  Associated Symptoms:?Crepitus/Grinding; ?No numbness or tingling;??No swelling;?No skin changes;?No weakness;?Moderate decrease in ROM  Activity:?Sedentary, full ADLs;?Pain interferes with function/daily activity (mild)?Patient?acknowledges?use of assistive devices, walker,?for assistance with ambulation.  Family History:?Family history of arthritis  ?  Patient is a twin and reports sister just has has TKA approximately 1 year ago.  Review of Systems  Constitutional:No fever;No chills;No weight loss  CV:No swelling;No edema  GI:No urinary retention;No urinary incontinence  :No fecal incontinence  Skin:No rash;No wound  Neuro:No numbness/tingling;No weakness;No saddle anesthesia  MSK: As above  Psych:No depression;No anxiety  Heme/Lymph:No easy bruising;No easy bleeding;No lymphadenopathy  Immuno:No MRSA history  Physical Exam  Vitals & Measurements  BP:?126/83?  HT:?162.56?cm? HT:?162.56?cm? WT:?108.7?kg? WT:?108.7?kg? BMI:?41.13?  MSK:RIGHTKNEE  General: No apparent  distress;?morbid obesity  Inspection:?limping;??partial weight bearing;??normal?alignment;??no swelling;?no erythema;?No contusion;?atrophy / deconditioning noted- mild quad  Palpation:?tenderness noted at ?lateral and medial joint lines; ?Crepitus:?Positive;?Normal temperature  ROM:?  ?????Active Extension/Flexion (0-140):?  Strength:?  ?????Flexion??4/5  ?????Extension??4/5  Special Tests:  ?????Ballotable Effusion: ?Positive  ?????Fluid Wave:?Negative  ?????Anterior Drawer:Negative?  ?????Lachman:?Negative?  ?????Varus Stress:?LCL stable at 0 and 30 degrees  ?????Valgus Stress:?MCL stable at 0 and 30 degrees  ?????Patellar Grind: ?Positive  Neurovascular:?Intact; 2+?distal pulse, sensation intact to light touch  Neuro/Psych: Awake, Alert, Oriented; Normal mood and affect  Lymphatic: No LAD  Skin and Soft Tissue: No bruising, No ecchymosis; No rash; Skin intact  Assessment/Plan  1.?Osteoarthritis of right knee  ?1.? Discussed with patient diagnosis and treatment recommendations.? Handout given.  2.? Imaging:?Radiological studies ordered and independently reviewed; Discussed with patient;  3.? Treatment plan: Conservative treatment to include oral glucosamine 1500 mg/day; Topical capsaicin as needed; Hot or cold therapy; Adequate vitamin C/D intake  4.? Procedure:?Discussed CSI vs VS injections as treatment options; since conservative measures did not improve symptoms patient consented for CSI today  5.? Activity:?Activity as tolerated; HEP to included aerobic conditioningwith non-painful activity and ROM/STG exercises; proper footwear; assistive device to avoid limping  6.? Therapy:none  7.? Medication:?First line treatment with daily ?acetaminophen 1000 mg three times daily; Medication precautions given  8.? RTC:?PRN; call if any issues  9.? Additional work-up:?None  Ordered:  Lidocaine inj., 5 mL, form: Injection, Intra-Articular, Once, first dose 02/19/18 12:52:00 CST, stop date 02/19/18 12:52:00  CST  triamcinolone, 40 mg, form: Injection, Intra-Articular, Once, first dose 18 12:52:00 CST, stop date 18 12:52:00 CST  asp/inj jnt/bursa, major 15798   Clinic Follow-up PRN  Office/Outpatient Visit Level 4 Established 49072   ?  2.?Morbid obesity  Patient educated that diet modifications with low impact exercises as tolerated for reduction in BMI would improve overall treatment and outcome.  ?  Orders:  XR Knee Right 4 or More Views   Problem List/Past Medical History  Ongoing  DVT (deep venous thrombosis)  Morbid obesity  O/E wound healing delayed  PE (pulmonary embolism)  Historical  Acute gingivitis  Anticoagulants causing adverse effect in therapeutic use  Arthropathy  Benign essential hypertension  Dental caries  Dry skin  Endometrial intraepithelial neoplasia (EIN)  Fatigue  GERD - Gastro-esophageal reflux disease  Hand joint pain  Hypertension  Menopausal flushing  Menopausal state  Mixed hyperlipidemia  Obesity  Osteoarthrosis  Pain in limb  Postmenopausal bleeding  Sleep disturbance  Suprapubic pain  Urinary tract infection  Visual impairment  Procedure/Surgical History  removal of gallbladder (2000), appendectomy (), Abdominal hysterectomy,  Delivery, dilation and curettage, Revise ovarian tube.  Medications  atenolol 50 mg oral tablet, 50 mg= 1 tab(s), Oral, Daily  citalopram 10 mg oral tablet, 10 mg= 1 tab(s), Oral, Daily,? ?Not taking  citalopram 20 mg oral tablet, 20 mg= 1 tab(s), Oral, Daily  famotidine 20 mg oral tablet, 20 mg= 1 tab(s), Oral, BID  ferrous gluconate 324 mg (37.5 mg elemental iron) oral tablet, 324 mg= 1 tab(s), Oral, Daily  IBUPROFEN 800MG TABLETS, 800 mg= 1 tab(s), Oral, TID,? ?Not taking  Kenalog-40 injectable suspension, 40 mg= 1 mL, Intra-Articular, Once  Lidocaine 1% PF 5ml inj, 5 mL, Intra-Articular, Once  Nystop 100,000 units/g topical powder, 1 bhavesh, TOP, TID  pravastatin 10 mg oral tablet, 10 mg= 1 tab(s), Oral, Daily,? ?Not  taking  PRAVASTATIN SODIUM 20 MG TAB  Xarelto 20mg Tablet, 20 mg, Oral, With Supper  Allergies  No Known Allergies  Social History  Alcohol - Denies Alcohol Use, 09/18/2015  Never, 09/23/2015  Employment/School  Unemployed, 09/18/2015  Home/Environment  Lives with Children., 09/23/2015  Substance Abuse  Never, 09/23/2015  Tobacco - Denies Tobacco Use, 09/18/2015  Never smoker, 09/23/2015  Family History  Chronic obstructive lung disease: Mother.  Heart disease: Father.  Pneumonia.: Father.  Diagnostic Results  X ray right knee 2/19/28:  This is compared to a previous study from 2/7/2017  There are degenerative changes most significantly in the medial joint  compartment. There is no dislocation. No fractures are seen.  IMPRESSION: Degenerative changes most significantly in the medial  joint compartment grossly stable from the previous exam.     [1]?XR Knee Right 4 or More Views; Ta BISHOP, Ronny HOLLY 02/19/2018 12:42 CST

## 2022-05-03 NOTE — HISTORICAL [facility] CERN[facility]
This is a historical note converted from [employer]. Formatting and pictures may have been removed.  Please reference [employer] for original formatting and attached multimedia. Procedure Name  Date/Time:2/19/2018 13:00:31  Procedure:??Right?Knee Injection  Indications:??Diagnostic and Therapeutic Indication - decrease pain, increase range of motion and improve quality of life  RISKS: Possible complications with injection include?bleeding, infection (0.01%), tendon rupture, steroid flare, fat pad or soft tissue atrophy, skin depigmentation, and vasovagal response. ?(Steroid flair treatment is rest, ice, NSAIDs and resolves in 24-36 hours.)  Consent:???Procedure, risks, benefits, & alternatives explained to patient, who voiced understanding and agreed to proceed with procedure. ?Consent signed and?scanned into the medical record. No absolute contraindications (cellulitis overlying joint, infection, lack of informed consent, allergy to injection mediation, josephine protein or egg allergy for sodium hyaluronate, or history of steroid flare) or relative contraindications (brittle or out of control DM HgA1c > 10, coagulopathy INR > 3.5, previous joint replacement, or history of AVN).  Description:?Time out performed. The patient was prepped?using Chlorhexidine scrub after the appropriate?identification of anatomic landmarks.? Sterile needle used (size # 21 gauge, length 1.5 inch)?Topical anesthetic of ethyl chloride was used.? ?5 mL of 1% lidocaine plain with 40 mg of Kenalog injected.  Complications:?None?  EBL:??None  Post Procedure:?Patient reports improvement in pain and ROM. Patient alert, moving all extremities. ?Good peripheral pulses, no signs of vascular compromise, range of motion intact. ?Patient tolerated procedure well. ?Aftercare instructions were given to patient at time of discharge.??Relative rest for 3 days - avoiding excessive activity. ?Pendulum stretching exercises followed by stretching exercises  daily?starting on day 4.? Place ice on area for 15 minutes every 4 to 6 hours.??Tylenol 1000mg twice a day or ibuprofen 600 mg three times per day for next 3-4 days if not on medication already. ?Protect the area for the next 1-8 hours if anesthetic was used. ?Avoid excessive activity for next 3 to 4 weeks.?ER precautions for fever, severe joint pain, or allergic reaction or other new symptoms related to joint injection.

## 2022-05-03 NOTE — HISTORICAL [facility]
This is a historical note converted from Zhou. Formatting and pictures may have been removed.  Please reference Zhou for original formatting and attached multimedia. Chief Complaint  Post Op right total knee 11/8/21-2/6/22. States intermitten aching around incision site. States pain level 7/10 right now. States doing home therapy 3x a week.  History of Present Illness  56-year-old female presented to the clinic today 2 weeks status post right total knee arthroplasty. ?Overall the patient is doing well. ?She is ambulating with a walker today here in clinic.? For pain she is currently taking Norco 7.5 mg, which she states does help subside the pain.? Home health is ago therapy has been on her house 3 times a week in which they have been working on range of motion, stability, and?mobility. ?She has had no complaints with her incision site such as bleeding and or drainage.  Review of Systems  Denies fevers, chills, chest pain, shortness of breath. Comprehensive review of systems performed and otherwise negative except as noted in HPI  Physical Exam  Vitals & Measurements  T:?97.6? ?F (Oral)? HR:?81(Peripheral)? BP:?123/75?  HT:?162.00?cm? WT:?107.000?kg? BMI:?40.77?  General: awake and alert, no acute distress, healthy appearing  ?Head and Neck: Head atraumatic/normocephalic. Moist MM  ?CV: brisk cap refill  ?Lungs: non-labored breathing, w/o cough or SOB  ?Skin: no rashes present, warm to touch  ?Neuro: sensation grossly intact distally  ?  Examination left knee:  Incision clean dry and intact; staples intact. No erythema or drainage or signs of infection.  Sensation intact distally to left foot  Positive FHL/EHL/gastrocsoleus/tib ant  Brisk capillary refill to left foot  No swelling or signs of DVT  Range of motion: extension at 5 and flexion at 90  Stable to varus valgus  Stable to anterior and posterior drawer  Assessment/Plan  1.?Aftercare following joint replacement surgery?Z47.1  ?Patient presents to clinic  today 2 weeks status post left total knee arthroplasty. ?Overall the patient is improving. ?Her knee is stable upon examination and x-rays today in clinic. ?In regards to her incision site is well-healed and staples have been removed. ?She was educated that she can now shower without vigorously scrubbing the incision site.? We refilled her Norcos today here in clinic.? She is to continue physical therapy with home health for strengthening, mobility, and range of motion.? We did speak about her going to an outpatient facility, in which the family member at side?states that she has no transportation at this time.? She is to follow-up here in clinic within 1 month with x-rays to reassess her knee at this time.  Ordered:  Clinic Follow up, *Est. 12/23/21 3:00:00 CST, Order for future visit, Aftercare following joint replacement surgery, Orthopaedics  Post-Op follow-up visit [id number] , Aftercare following joint replacement surgery, Orthopaedics Clinic, 11/23/21 9:53:00 CST  XR Knee Left 3 Views, Routine, *Est. 12/23/21 3:00:00 CST, None, Stretcher, Patient Has IV?, Rad Type, Order for future visit, Aftercare following joint replacement surgery, Not Scheduled, *Est. 12/23/21 3:00:00 CST  ?  Orders:  acetaminophen-HYDROcodone, 1 tab(s), Oral, q6hr, PRN PRN as needed for pain, # 28 tab(s), 0 Refill(s), Pharmacy: [id number], 162, cm, Height/Length Dosing, 11/23/21 9:06:00 CST, 107, kg, Weight Dosing, 11/23/21 9:06:00 CST  The above findings, diagnostics, and treatment plan were discussed with Dr. Irving Villatoro who is in agreement with the plan of care.  Referrals  Clinic Follow up, *Est. 12/23/21 3:00:00 CST, Order for future visit, Aftercare following joint replacement surgery, [facility]   Problem List/Past Medical History  Ongoing  Bladder  Diabetes mellitus  DVT (deep venous thrombosis)  Hysterectomy  Lumbar degenerative disc disease  Lumbar facet arthropathy  Lumbar radiculopathy  Lumbar spinal  stenosis  Morbid obesity  O/E wound healing delayed  PE (pulmonary embolism)  Historical  Acute gingivitis  Anticoagulants causing adverse effect in therapeutic use  Arthropathy  Benign essential hypertension  Dental caries  Dry skin  Endometrial intraepithelial neoplasia (EIN)  Fatigue  GERD - Gastro-esophageal reflux disease  Hand joint pain  Hypertension  Menopausal flushing  Menopausal state  Mixed hyperlipidemia  Obesity  Osteoarthrosis  Pain in limb  Postmenopausal bleeding  Sleep disturbance  Suprapubic pain  Urinary tract infection  Visual impairment  Procedure/Surgical History  MIK BOBBY Total Knee Arthroplasty (Right) (11/08/2021)  Replacement of Right Knee Joint with Synthetic Substitute, Open Approach (11/08/2021)  Robotic Assisted Procedure of Lower Extremity, Open Approach (11/08/2021)  79477 - INJ FORAMEN EPIDURAL LUM / SAC 1 LEVEL (Right) (07/22/2019)  29457 - INJ FORAMEN EPIDURAL LUM / SAC EA ADDL LEVEL (Right) (07/22/2019)  Injection(s), anesthetic agent and/or steroid, transforaminal epidural, with imaging guidance (fluoroscopy or CT); lumbar or sacral, each additional level (List separately in addition to code for primary procedure) (07/22/2019)  Injection(s), anesthetic agent and/or steroid, transforaminal epidural, with imaging guidance (fluoroscopy or CT); lumbar or sacral, single level (07/22/2019)  Introduction of Anesthetic Agent into Spinal Canal, Percutaneous Approach (07/22/2019)  Introduction of Anti-inflammatory into Spinal Canal, Percutaneous Approach (07/22/2019)  72805 - INJ FORAMEN EPIDURAL LUM / SAC 1 LEVEL (Right) (06/06/2019)  19860 - INJ FORAMEN EPIDURAL LUM / SAC EA ADDL LEVEL (Right) (06/06/2019)  Injection(s), anesthetic agent and/or steroid, transforaminal epidural, with imaging guidance (fluoroscopy or CT); lumbar or sacral, each additional level (List separately in addition to code for primary procedure) (06/06/2019)  Injection(s), anesthetic agent and/or steroid,  transforaminal epidural, with imaging guidance (fluoroscopy or CT); lumbar or sacral, single level (2019)  Introduction of Anesthetic Agent into Spinal Canal, Percutaneous Approach (2019)  Introduction of Anti-inflammatory into Spinal Canal, Percutaneous Approach (2019)  Fluoroscopy of Spinal Cord using Low Osmolar Contrast (10/17/2018)  Myelography via lumbar injection, including radiological supervision and interpretation; lumbosacral (10/17/2018)  removal of gallbladder (2000)  appendectomy ()  Abdominal hysterectomy   Delivery  Colonoscopy  dilation and curettage  Hernia  Revise ovarian tube   Medications  acetaminophen-hydrocodone 325 mg-5 mg oral tablet, 1 tab(s), Oral, q6hr  atenolol 50 mg oral tablet, 50 mg= 1 tab(s), Oral, Daily  citalopram 20 mg oral tablet, 20 mg= 1 tab(s), Oral, Daily  cloniDINE 0.1 mg oral tablet, 0.1 mg= 1 tab(s), Oral, BID, PRN,? ?Still taking, not as prescribed: prn  famotidine 20 mg oral tablet, 20 mg= 1 tab(s), Oral, Daily,? ?Not taking  Flomax 0.4 mg oral capsule, 0.4 mg= 1 cap(s), Oral, qPM, 1 refills,? ?Not taking  lisinopril 10 mg oral tablet, 5 mg= 0.5 tab(s), Oral, Daily  methocarbamol 750 mg oral tablet, 1500 mg= 2 tab(s), Oral, TID  Neurontin 300 mg oral capsule, 300 mg= 1 cap(s), Oral, At Bedtime  Norco 7.5 mg-325 mg oral tablet, 1 tab(s), Oral, q6hr, PRN  polyethylene glycol 3350 oral powder for reconstitution, 17 gm, Oral, BID  PRAVASTATIN SODIUM 20 MG TAB, 1, Daily  Xarelto 20mg Tablet, 20 mg, Oral, With Supper  Allergies  No Known Allergies  Social History  Abuse/Neglect  No, No, Yes, 2021  Alcohol - Denies Alcohol Use, 2015  Never, 2021  Employment/School  Disabled, 2019  Exercise  Home/Environment  Lives with Children., 2015  Nutrition/Health  Regular, 2019  Substance Use  Never, 2015  Tobacco - Denies Tobacco Use, 2015  Never (less than 100 in lifetime), N/A, 2021  Family  History  Chronic obstructive lung disease: Mother.  Heart disease: Father.  Pneumonia.: Father.  Health Maintenance  Health Maintenance  ???Pending?(in the next year)  ??? ??OverDue  ??? ? ? ?Diabetes Maintenance-Eye Exam due??03/13/20??and every 2??year(s)  ??? ??Due?  ??? ? ? ?ADL Screening due??11/23/21??and every 1??year(s)  ??? ? ? ?Aspirin Therapy for CVD Prevention due??11/23/21??and every 1??year(s)  ??? ? ? ?Colorectal Screening due??11/23/21??and every 10??year(s)  ??? ? ? ?Diabetes Maintenance-Fasting Lipid Profile due??11/23/21??Variable frequency  ??? ? ? ?Diabetes Maintenance-Foot Exam due??11/23/21??Unknown Frequency  ??? ? ? ?Medicare Annual Wellness Exam due??11/23/21??and every 1??year(s)  ??? ? ? ?Tetanus Vaccine due??11/23/21??and every 10??year(s)  ??? ? ? ?Zoster Vaccine due??11/23/21??Unknown Frequency  ??? ??Due In Future?  ??? ? ? ?Obesity Screening not due until??01/01/22??and every 1??year(s)  ??? ? ? ?Alcohol Misuse Screening not due until??01/02/22??and every 1??year(s)  ??? ? ? ?Breast Cancer Screening not due until??07/13/22??and every 2??year(s)  ??? ? ? ?Diabetes Maintenance-HgbA1c not due until??10/19/22??and every 1??year(s)  ??? ? ? ?Diabetes Maintenance-Medication Prescribed not due until??11/09/22??and every 1??year(s)  ??? ? ? ?Hypertension Management-BMP not due until??11/10/22??and every 1??year(s)  ??? ? ? ?Diabetes Maintenance-Serum Creatinine not due until??11/10/22??and every 1??year(s)  ???Satisfied?(in the past 1 year)  ??? ??Satisfied?  ??? ? ? ?Alcohol Misuse Screening on??04/13/21.??Satisfied by Gypsy Kebede LPN  ??? ? ? ?Blood Pressure Screening on??11/23/21.??Satisfied by Princess Chester  ??? ? ? ?Body Mass Index Check on??11/23/21.??Satisfied by Princess Chester  ??? ? ? ?Depression Screening on??11/23/21.??Satisfied by Princess Chester  ??? ? ? ?Diabetes Maintenance-Serum Creatinine on??11/10/21.??Satisfied by Isabel Rivera  ??? ? ? ?Diabetes Screening  on??11/10/21.??Satisfied by Isabel Rivera  ??? ? ? ?Hypertension Management-Blood Pressure on??11/23/21.??Satisfied by Princess Chester  ??? ? ? ?Influenza Vaccine on??11/23/21.??Satisfied by Princess Chester  ??? ? ? ?Obesity Screening on??11/23/21.??Satisfied by Princess Chester  ?  Diagnostic Results  AP &?lateral?left knee reviewed. ?Patients implants appear well fixed. ?No signs of loosening or subsidence noted.

## 2022-06-28 ENCOUNTER — OFFICE VISIT (OUTPATIENT)
Dept: ORTHOPEDICS | Facility: CLINIC | Age: 57
End: 2022-06-28
Payer: MEDICARE

## 2022-06-28 ENCOUNTER — HOSPITAL ENCOUNTER (OUTPATIENT)
Dept: RADIOLOGY | Facility: CLINIC | Age: 57
Discharge: HOME | End: 2022-06-28
Attending: ORTHOPAEDIC SURGERY
Payer: MEDICARE

## 2022-06-28 VITALS — BODY MASS INDEX: 41.64 KG/M2 | WEIGHT: 235 LBS | HEIGHT: 63 IN

## 2022-06-28 DIAGNOSIS — M25.561 PAIN IN BOTH KNEES, UNSPECIFIED CHRONICITY: Primary | ICD-10-CM

## 2022-06-28 DIAGNOSIS — M25.562 PAIN IN BOTH KNEES, UNSPECIFIED CHRONICITY: Primary | ICD-10-CM

## 2022-06-28 DIAGNOSIS — Z47.1 AFTERCARE FOLLOWING JOINT REPLACEMENT SURGERY, UNSPECIFIED JOINT: ICD-10-CM

## 2022-06-28 DIAGNOSIS — M17.12 PRIMARY OSTEOARTHRITIS OF LEFT KNEE: ICD-10-CM

## 2022-06-28 PROCEDURE — 1160F RVW MEDS BY RX/DR IN RCRD: CPT | Mod: CPTII,,, | Performed by: NURSE PRACTITIONER

## 2022-06-28 PROCEDURE — 1159F PR MEDICATION LIST DOCUMENTED IN MEDICAL RECORD: ICD-10-PCS | Mod: CPTII,,, | Performed by: NURSE PRACTITIONER

## 2022-06-28 PROCEDURE — 1160F PR REVIEW ALL MEDS BY PRESCRIBER/CLIN PHARMACIST DOCUMENTED: ICD-10-PCS | Mod: CPTII,,, | Performed by: NURSE PRACTITIONER

## 2022-06-28 PROCEDURE — 99213 OFFICE O/P EST LOW 20 MIN: CPT | Mod: 25,,, | Performed by: NURSE PRACTITIONER

## 2022-06-28 PROCEDURE — 1159F MED LIST DOCD IN RCRD: CPT | Mod: CPTII,,, | Performed by: NURSE PRACTITIONER

## 2022-06-28 PROCEDURE — 20610 LARGE JOINT ASPIRATION/INJECTION: L KNEE: ICD-10-PCS | Mod: LT,,, | Performed by: NURSE PRACTITIONER

## 2022-06-28 PROCEDURE — 73564 XR KNEE COMP 4 OR MORE VIEWS BILAT: ICD-10-PCS | Mod: 50,,, | Performed by: ORTHOPAEDIC SURGERY

## 2022-06-28 PROCEDURE — 20610 DRAIN/INJ JOINT/BURSA W/O US: CPT | Mod: LT,,, | Performed by: NURSE PRACTITIONER

## 2022-06-28 PROCEDURE — 73564 X-RAY EXAM KNEE 4 OR MORE: CPT | Mod: 50,,, | Performed by: ORTHOPAEDIC SURGERY

## 2022-06-28 PROCEDURE — 3008F BODY MASS INDEX DOCD: CPT | Mod: CPTII,,, | Performed by: NURSE PRACTITIONER

## 2022-06-28 PROCEDURE — 3008F PR BODY MASS INDEX (BMI) DOCUMENTED: ICD-10-PCS | Mod: CPTII,,, | Performed by: NURSE PRACTITIONER

## 2022-06-28 PROCEDURE — 99213 PR OFFICE/OUTPT VISIT, EST, LEVL III, 20-29 MIN: ICD-10-PCS | Mod: 25,,, | Performed by: NURSE PRACTITIONER

## 2022-06-28 RX ORDER — FAMOTIDINE 20 MG/1
20 TABLET, FILM COATED ORAL
COMMUNITY

## 2022-06-28 RX ORDER — CLONIDINE HYDROCHLORIDE 0.1 MG/1
0.1 TABLET ORAL
COMMUNITY
Start: 2021-11-01

## 2022-06-28 RX ORDER — WARFARIN 6 MG/1
6 TABLET ORAL
COMMUNITY

## 2022-06-28 RX ORDER — METHOCARBAMOL 750 MG/1
1500 TABLET, FILM COATED ORAL
COMMUNITY
Start: 2021-11-23

## 2022-06-28 RX ORDER — BETAMETHASONE SODIUM PHOSPHATE AND BETAMETHASONE ACETATE 3; 3 MG/ML; MG/ML
12 INJECTION, SUSPENSION INTRA-ARTICULAR; INTRALESIONAL; INTRAMUSCULAR; SOFT TISSUE
Status: DISCONTINUED | OUTPATIENT
Start: 2022-06-28 | End: 2022-06-28 | Stop reason: HOSPADM

## 2022-06-28 RX ORDER — LIDOCAINE HYDROCHLORIDE 20 MG/ML
5 INJECTION, SOLUTION EPIDURAL; INFILTRATION; INTRACAUDAL; PERINEURAL
Status: DISCONTINUED | OUTPATIENT
Start: 2022-06-28 | End: 2022-06-28 | Stop reason: HOSPADM

## 2022-06-28 RX ORDER — ATENOLOL 50 MG/1
50 TABLET ORAL
COMMUNITY

## 2022-06-28 RX ORDER — GABAPENTIN 300 MG/1
300 CAPSULE ORAL
COMMUNITY
Start: 2021-11-10

## 2022-06-28 RX ORDER — HYDROCODONE BITARTRATE AND ACETAMINOPHEN 5; 325 MG/1; MG/1
TABLET ORAL
COMMUNITY
Start: 2021-11-23

## 2022-06-28 RX ORDER — PRAVASTATIN SODIUM 10 MG/1
10 TABLET ORAL
COMMUNITY

## 2022-06-28 RX ADMIN — LIDOCAINE HYDROCHLORIDE 5 ML: 20 INJECTION, SOLUTION EPIDURAL; INFILTRATION; INTRACAUDAL; PERINEURAL at 02:06

## 2022-06-28 RX ADMIN — BETAMETHASONE SODIUM PHOSPHATE AND BETAMETHASONE ACETATE 12 MG: 3; 3 INJECTION, SUSPENSION INTRA-ARTICULAR; INTRALESIONAL; INTRAMUSCULAR; SOFT TISSUE at 02:06

## 2022-06-28 NOTE — PROGRESS NOTES
Past Medical History:   Diagnosis Date    Diabetes mellitus, type 2     Hypertension        Past Surgical History:   Procedure Laterality Date     SECTION      GALLBLADDER SURGERY      HERNIA REPAIR      HYSTERECTOMY      KNEE SURGERY         Current Outpatient Medications   Medication Sig    atenoloL (TENORMIN) 50 MG tablet Take 50 mg by mouth.    cloNIDine (CATAPRES) 0.1 MG tablet Take 0.1 mg by mouth.    famotidine (PEPCID) 20 MG tablet Take 20 mg by mouth.    gabapentin (NEURONTIN) 300 MG capsule Take 300 mg by mouth.    HYDROcodone-acetaminophen (NORCO) 5-325 mg per tablet Take by mouth.    methocarbamoL (ROBAXIN) 750 MG Tab Take 1,500 mg by mouth.    pravastatin (PRAVACHOL) 10 MG tablet Take 10 mg by mouth.    warfarin (COUMADIN) 6 MG tablet Take 6 mg by mouth.     No current facility-administered medications for this visit.       Review of patient's allergies indicates:  No Known Allergies    No family history on file.    Social History     Socioeconomic History    Marital status:    Tobacco Use    Smoking status: Never Smoker    Smokeless tobacco: Never Used       Chief Complaint:   Chief Complaint   Patient presents with    Follow-up     S/P Right TKA on 21    Pain     Right Knee pain, pt states of pain in both knees, pt states pain is like sharp stabbing pain, pt taking otc tylenol with little relief , pt asked for an inj in left knee, pt also stated pain will wake her up or keep her up at night,        History of present illness: Va Fox is a 56 y.o. female, presents to clinic today with bilateral knee pain. She has a hx of a R TKA about 7 months ago. Having pain in the prepatella area. This is not constant. This is worse with bending. The left knee pain is located throughout the knee. This pain is worse with prolonged ambulation, bending, and squatting. This pain is better at rest. She would like a cortisone injection today in the left knee for her  pain.     Review of Systems:    Denies fevers, chills, chest pain, shortness of breath. Comprehensive review of systems performed and otherwise negative except as noted in HPI      Physical Examination:    General: awake and alert, no acute distress, healthy appearing  Head and Neck: Head atraumatic/normocephalic. Moist MM  CV: brisk cap refill  Lungs: non-labored breathing, w/o cough or SOB  Skin: no rashes present, warm to touch  Neuro: sensation grossly intact distall     Vital Signs:    There were no vitals filed for this visit.    Body mass index is 41.63 kg/m².    Examination right knee:    Incision clean dry and intact. No erythema or drainage or signs of infection.  Sensation intact distally to right foot  Positive FHL/EHL/gastrocsoleus/tib ant  Brisk capillary refill to right foot  No swelling or signs of DVT  Range of motion: extension at 0 and flexion at 115  Stable to varus valgus  Stable to anterior and posterior drawer    Focused Orthopedic Exam:    left knee without wound or skin breakdown.  +1 joint effusion  tenderness to palpation about the medial joint line  ROM from 0 extension to 115 flexion  stable to varus/valgus.  stable to anterior/posterior drawer  + McMurrays  Patellofemoral crepitus to ROM      X-rays: 4 views of bilateral knees reviewed. Patient's implants appear well fixed. No signs of loosening or subsidence noted to the right knee. Left knee has bone on bone end stage arthritic changes especially in the medial joint space.     Assessment::post op status post R TKA & Primary OA of the L knee    Plan:  Pt presents with bilateral knee pain. In regards to the right knee, it is stable upon exam and Xrays. She does have patella pain, which was not resurfaced during TKA. We did discuss her different treatment options, and she would like to continue with conservative measures. We will follow up with this knee at her 1 year visit. She does have end stage OA to the L knee and would like an  injection today. We will give her a cortisone injection and follow up upon her next visit. All questions and concerns were addressed. The patient understands and agrees with the plan of care.    After verbal consent was obtained the patient's left knee was prepped with Chloraprep. The left knee joint was then injected under sterile technique with 2 mL of 2% lidocaine and 12 mg betamethasone it was dressed with a Band-Aid. The patient received good relief from the injection and was able to ambulate normally from clinic. Injection was successfully performed by, SEYMOUR Bernabe.    The above findings, diagnostics, and treatment plan were discussed with Dr. Irving Villatoro who is in agreement with the plan of care.        This note was created using [employer] voice recognition software that occasionally misinterpreted phrases or words.    Consult note is delivered via Epic messaging service.

## 2022-06-28 NOTE — PROCEDURES
Large Joint Aspiration/Injection: L knee    Date/Time: 6/28/2022 2:30 PM  Performed by: JAGUAR Bernabe  Authorized by: Irving Villatoro MD     Consent Done?:  Yes (Verbal)  Indications:  Arthritis and pain  Timeout: prior to procedure the correct patient, procedure, and site was verified    Prep: patient was prepped and draped in usual sterile fashion    Local anesthesia used?: No      Details:  Needle Size:  22 G  Ultrasonic Guidance for needle placement?: No    Approach:  Anterolateral  Location:  Knee  Site:  L knee  Medications:  5 mL LIDOcaine (PF) 20 mg/mL (2%) 20 mg/mL (2 %); 12 mg betamethasone acetate-betamethasone sodium phosphate 6 mg/mL  Patient tolerance:  Patient tolerated the procedure well with no immediate complications

## 2024-03-28 NOTE — HISTORICAL [facility]
This is a historical note converted from Zhou. Formatting and pictures may have been removed.  Please reference Zhou for original formatting and attached multimedia. Procedure Name  Right L4 and L5?Transforaminal Epidural Steroid Injections  ?   Pre-Procedure Diagnoses:  1. Chronic pain syndrome  2. Low back pain  3. Lumbar radiculopathy  4. Lumbar disc displacement  5. Lumbar degenerative disc disease  ?   Post-Procedure Diagnoses:  1. Chronic pain syndrome  2. Low back pain  3. Lumbar radiculopathy  4. Lumbar disc displacement  5. Lumbar degenerative disc disease  ?   Anesthesia:  Local and MAC  ?  Estimated Blood Loss:  None  ?  Complications:  None  ?  Informed Consent:  The procedure, risks, benefits, and alternatives were discussed with the patient.? There were no contraindications to the procedure.? The patient expressed understanding and agreed to proceed.? Fully informed written consent was obtained.?  ?  Description of the Procedure:  The patient was taken to the operating room.? IV access was obtained prior to the start of the procedure.? The patient was positioned prone on the fluoroscopy table.? Continuous hemodynamic monitoring was initiated and continued throughout the duration of the procedure.? The skin overlying the lumbosacral spine was prepped with Betadine and draped into a sterile field.? An oblique fluoroscopic view was obtained on the right side at L4, with the superior articular process of the inferior vertebral body aligned with the pedicle.? Skin anesthesia was achieved using 1 mL of 1% lidocaine.? A 22-gauge 3.5-inch Quinke spinal needle was slowly inserted and advanced towards the 6 oclock position of the pedicle and into the epidural space.? Proper needle position was confirmed under AP, oblique, and lateral fluoroscopic views.? Negative aspiration for blood or CSF was confirmed.? 0.5 mL of Isovue contrast was injected.? Fluoroscopic imaging revealed a clear outline of the spinal  nerve with proximal spread of agent through the neural foramen and into the epidural space.? Then a combination of 5 mg of dexamethasone and 1 mL of 0.5% bupivacaine was easily injected.? There was no pain on injection.? The needle was removed intact and bleeding was nil.? The same procedure was repeated in identical fashion on the right side at L5. Sterile bandages were applied.? The patient was taken to the recovery room for further observation in stable condition.? The patient was then discharged home without any complications.   28-Mar-2024 01:45